# Patient Record
Sex: FEMALE | Race: WHITE | NOT HISPANIC OR LATINO
[De-identification: names, ages, dates, MRNs, and addresses within clinical notes are randomized per-mention and may not be internally consistent; named-entity substitution may affect disease eponyms.]

---

## 2022-08-11 ENCOUNTER — APPOINTMENT (OUTPATIENT)
Dept: BARIATRICS/WEIGHT MGMT | Facility: CLINIC | Age: 27
End: 2022-08-11

## 2022-08-11 VITALS
WEIGHT: 228.38 LBS | OXYGEN SATURATION: 100 % | DIASTOLIC BLOOD PRESSURE: 84 MMHG | HEIGHT: 66 IN | RESPIRATION RATE: 16 BRPM | SYSTOLIC BLOOD PRESSURE: 125 MMHG | BODY MASS INDEX: 36.7 KG/M2 | HEART RATE: 79 BPM

## 2022-08-11 DIAGNOSIS — K21.9 GASTRO-ESOPHAGEAL REFLUX DISEASE W/OUT ESOPHAGITIS: ICD-10-CM

## 2022-08-11 DIAGNOSIS — Z86.39 PERSONAL HISTORY OF OTHER ENDOCRINE, NUTRITIONAL AND METABOLIC DISEASE: ICD-10-CM

## 2022-08-11 DIAGNOSIS — R46.81 OBSESSIVE-COMPULSIVE BEHAVIOR: ICD-10-CM

## 2022-08-11 DIAGNOSIS — Z86.59 PERSONAL HISTORY OF OTHER MENTAL AND BEHAVIORAL DISORDERS: ICD-10-CM

## 2022-08-11 DIAGNOSIS — Z83.3 FAMILY HISTORY OF DIABETES MELLITUS: ICD-10-CM

## 2022-08-11 PROBLEM — Z00.00 ENCOUNTER FOR PREVENTIVE HEALTH EXAMINATION: Status: ACTIVE | Noted: 2022-08-11

## 2022-08-11 PROCEDURE — 99205 OFFICE O/P NEW HI 60 MIN: CPT

## 2022-08-17 LAB
ALBUMIN SERPL ELPH-MCNC: 4.6 G/DL
ALP BLD-CCNC: 72 U/L
ALT SERPL-CCNC: 16 U/L
ANION GAP SERPL CALC-SCNC: 13 MMOL/L
AST SERPL-CCNC: 15 U/L
BASOPHILS # BLD AUTO: 0.04 K/UL
BASOPHILS NFR BLD AUTO: 0.5 %
BILIRUB SERPL-MCNC: <0.2 MG/DL
BUN SERPL-MCNC: 13 MG/DL
CALCIUM SERPL-MCNC: 9.5 MG/DL
CHLORIDE SERPL-SCNC: 104 MMOL/L
CHOLEST SERPL-MCNC: 197 MG/DL
CO2 SERPL-SCNC: 25 MMOL/L
CREAT SERPL-MCNC: 0.75 MG/DL
EGFR: 112 ML/MIN/1.73M2
EOSINOPHIL # BLD AUTO: 0.27 K/UL
EOSINOPHIL NFR BLD AUTO: 3.2 %
ESTIMATED AVERAGE GLUCOSE: 105 MG/DL
GLUCOSE SERPL-MCNC: 95 MG/DL
HBA1C MFR BLD HPLC: 5.3 %
HCT VFR BLD CALC: 39.1 %
HDLC SERPL-MCNC: 69 MG/DL
HGB BLD-MCNC: 12.2 G/DL
IMM GRANULOCYTES NFR BLD AUTO: 0.2 %
IRON SATN MFR SERPL: 7 %
IRON SERPL-MCNC: 29 UG/DL
LDLC SERPL CALC-MCNC: 110 MG/DL
LYMPHOCYTES # BLD AUTO: 2.6 K/UL
LYMPHOCYTES NFR BLD AUTO: 30.9 %
MAN DIFF?: NORMAL
MCHC RBC-ENTMCNC: 25.5 PG
MCHC RBC-ENTMCNC: 31.2 GM/DL
MCV RBC AUTO: 81.8 FL
MONOCYTES # BLD AUTO: 0.56 K/UL
MONOCYTES NFR BLD AUTO: 6.7 %
NEUTROPHILS # BLD AUTO: 4.92 K/UL
NEUTROPHILS NFR BLD AUTO: 58.5 %
NONHDLC SERPL-MCNC: 128 MG/DL
PLATELET # BLD AUTO: 340 K/UL
POTASSIUM SERPL-SCNC: 4.4 MMOL/L
PROT SERPL-MCNC: 6.9 G/DL
RBC # BLD: 4.78 M/UL
RBC # FLD: 14.6 %
SODIUM SERPL-SCNC: 142 MMOL/L
TIBC SERPL-MCNC: 396 UG/DL
TRIGL SERPL-MCNC: 88 MG/DL
TSH SERPL-ACNC: 3.73 UIU/ML
UIBC SERPL-MCNC: 367 UG/DL
VIT B12 SERPL-MCNC: 336 PG/ML
WBC # FLD AUTO: 8.41 K/UL

## 2022-08-17 NOTE — HISTORY OF PRESENT ILLNESS
[] : 2. Do you feel distressed about your episodes of excessive overeating? Yes [Often] : 5. Within the past 3 months, during your episodes of excessive overeating, how often were you embarrassed by how much you ate? Often [Sometimes] : 6. Within the past 3 months, during your episodes of excessive overeating, how often did you feel disgusted with yourself or guilty afterward? Sometimes [Never or Rarely] : 7. Within the past 3 months, during your episodes of excessive overeating, how often did you make yourself vomit as a means to control your weight or shape? Never or Rarely [FreeTextEntry1] : 27 year old female referred for weight management\par Patient has struggled with her weight for the past few years.  In college weighed 180 pounds, counted calories and became obsessive lost 45 pounds in 4-5 months.  Over the past year gained 40-50 pounds.  Feels tired all the time.  +emotional eating, comfort food.  Living at home with her parents but closing on a condo in [place] soon.  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  Has a treadmill in her parent's house\par Followed by a telehealth psychiatry group [facility] for her OCD which she feels is well controlled.  Denies depression\par

## 2022-08-17 NOTE — ASSESSMENT
[FreeTextEntry1] : 27 year old female with class II obesity\par Exhibits emotional eating behavior but not binge eating disorder- will primarily focus on using hunger scale and keeping a logbook for binge episodes, finding alternative ways to deal with stressors- will start doing more crafts, walking\par Discussed dietary changes- will decrease carbs, increase protein, start eating breakfast, increase fruits and vegetables, cut down evening snacking or have a piece of fruit if hungry, increase water intake, avoid soda, discussed healthy plate\par Committed to exercise- brisk walk/run 3x a week\par May consider using Contrave in the future will speak to her psych NP to see if this is a possibility \par Will check labs \shasta Will work with RD\shasta F/U with me in 1 month

## 2022-08-17 NOTE — ADDENDUM
[FreeTextEntry1] : 8/17/22- Reviewed labs from 8/11 with patient- borderline low iron and B12- recommended once a day MVI with iron.

## 2022-08-17 NOTE — CONSULT LETTER
[Dear  ___] : Dear  [unfilled], [Consult Letter:] : I had the pleasure of evaluating your patient, [unfilled]. [( Thank you for referring [unfilled] for consultation for _____ )] : Thank you for referring [unfilled] for consultation for [unfilled] [Please see my note below.] : Please see my note below. [Consult Closing:] : Thank you very much for allowing me to participate in the care of this patient.  If you have any questions, please do not hesitate to contact me. [Sincerely,] : Sincerely, [FreeTextEntry3] : Nae Peraza FNP-BC, [facility]

## 2022-08-19 ENCOUNTER — APPOINTMENT (OUTPATIENT)
Dept: BARIATRICS/WEIGHT MGMT | Facility: CLINIC | Age: 27
End: 2022-08-19

## 2022-09-21 ENCOUNTER — APPOINTMENT (OUTPATIENT)
Dept: BARIATRICS/WEIGHT MGMT | Facility: CLINIC | Age: 27
End: 2022-09-21

## 2022-09-21 VITALS
OXYGEN SATURATION: 98 % | RESPIRATION RATE: 16 BRPM | DIASTOLIC BLOOD PRESSURE: 78 MMHG | WEIGHT: 229 LBS | HEIGHT: 66 IN | HEART RATE: 82 BPM | BODY MASS INDEX: 36.8 KG/M2 | SYSTOLIC BLOOD PRESSURE: 116 MMHG

## 2022-09-21 DIAGNOSIS — E66.9 OBESITY, UNSPECIFIED: ICD-10-CM

## 2022-09-21 PROCEDURE — 99214 OFFICE O/P EST MOD 30 MIN: CPT

## 2022-09-24 PROBLEM — E66.9 OBESITY, CLASS II, BMI 35-39.9: Status: ACTIVE | Noted: 2022-08-11

## 2022-09-24 NOTE — HISTORY OF PRESENT ILLNESS
[FreeTextEntry1] : 27 year old female referred for weight management\par Patient has struggled with her weight for the past few years.  In college weighed 180 pounds, counted calories and became obsessive lost 45 pounds in 4-5 months.  Over the past year gained 40-50 pounds.  Feels tired all the time.  +emotional eating, comfort food.  Living at home with her parents but closing on a condo in [place] soon.  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  Has a treadmill in her parent's house\par Followed by a telehealth psychiatry group [facility] for her OCD which she feels is well controlled.  Denies depression\par \par \par 9/24/22- Patient's weight stable.  Did not see RD yet.  Less binging on foods- working on more minful eating.  Decreased junk food and switched to diet soda.  School has restarted.  Not exercising.  Feels she benefits from increased accountability.

## 2022-09-24 NOTE — ASSESSMENT
[FreeTextEntry1] : 27 year old female with class II obesity\par Patient would like to focus on lifestyle changes- discussed journaling of her eating patterns, seeing an RD, increasing exercise and going to the gym, continue mindful eating strategies\par F/U with me 1 month after RD session